# Patient Record
(demographics unavailable — no encounter records)

---

## 2025-01-29 NOTE — PHYSICAL EXAM
[Right] : right knee [de-identified] : Constitutional: The patient appears well developed, well nourished. Examination of patients ability to communicate functionally was normal.       Neurologic: Coordination is normal. Alert and oriented to time, place and person. No evidence of mood disorder, calm affect.         RIGHT    KNEE  : Inspection of the knee is as follows: moderate effusion. no ecchymosis, no streaking, no erythema, no atrophy, no deformities of the quad tendon and no deformities of patellar tendon.      MILD VARUS ALIGNMENT Palpation of the knee is as follows: medial joint line tenderness, lateral joint line tenderness, medial facet of patella tenderness, lateral facet of patella tenderness and crepitus. no palpable masses and no increased warmth.       Knee Range of Motion is as follows in degrees:        Extension: 0    Flexion: 105        Strength examination of the knee is as follows:    Quadriceps strength is 5/5    Hamstring strength is 5/5       Ligament Stability and Special Test ligamentously stable, negative anterior draw, negative Lachman test, negative posterior draw and no varus or valgus instability.    negative McMurrays test and able to do active straight leg raise without an extensor lag.       Neurological examination of the knee is as follows: light touch is intact throughout.       Gait and function is as follows: mildly antalgic gait. CANE [FreeTextEntry9] : ap/lat/skiers show medial joint bone on bone contact KL grade 4, patellar spurring

## 2025-01-29 NOTE — HISTORY OF PRESENT ILLNESS
[de-identified] : 01/29/2025  THALIA GRIFFIN 72 year y/o F presents today for right knee pain.   She saw Dr Costa who was supposed to have Right TKA however she was scared and decided to hold off. She states since July the knee pain has become severe.  She needs a cane to ambulate due to this knee pain.   Date of Onset: Over a year Mechanism of injury: NKI   Pain:    At Rest: 6/10    With Activity: 9/10   Have you been treated for this in the past? Patient reports she saw Dr. Tse and had an ablation about 1 year ago. Patient reports she is currently prescribed oxycodone 5mg. Patient reports she saw Dr. Costa and was supposed to get a RTKA but the patient reports she was scared.  OTC/Rx Medication: Heat, Ice, Elevation: Previous Imaging/Studies:

## 2025-01-29 NOTE — DISCUSSION/SUMMARY
[de-identified] : Diagnosis Knee Osteoarthritis    THALIA GRIFFIN 72 year  F was seen and evaluated in the office today. Following evaluation, and history of the patient's condition at length, the pathology was explained in full to the patient in layman's terms. Patient has Knee Osteoarthritis. Osteoarthritis is a degenerative joint disease where the cartilage in the knee gradually wears away, leading to pain, stiffness, and reduced mobility. Initially, symptoms may be mild, however this is a progressive condition, and the pain is expected to become more severe and persistent. Advanced stages of knee OA can result in significant disability, making daily activities challenging. I discussed with the patient that since this condition is expected to continue to worsen, they will eventually need a total knee replacement in their life time.   In the interim, discussed with patient several different treatment options regarding managing the gradual loss of function associated with knee OA, along with specific risks and benefits. Nonsurgical options including but not limited to Corticosteroid Injection, Visco Supplementation, Meloxicam 15mg, Medrol Dose Pack, along with activity modification such as non-impact exercise and organized physical therapy. The risk of morbidity associated with proposed treatments were discussed.   Furthermore, discussed with THALIA that they could also delay any immediate treatment options and continue to observe and self-care for the discussed problem. Discussed Home Exercise Programs as well as Rest, Ice and elevation. Patient had ample time to ask any questions about todays visit and the diagnosis, and all questions were answered. Patient verbally expressed they understand the plan going forward.   --   At this time, indicated patient for Meloxicam 15mg due to pain and inflammation of the knee.   Patient has previously tried meloxicam without relief    --   At this time, patient is indicated for physical therapy due to their reduced ROM and weakness. Discussed with patient the benefits of physical therapy due to their current pain and limited function. Patient has tried this with no relief. --  At this time the plan is for RIGHT TOTAL KNEE ARTHROPLASTY @ [facility] Patient currently smoking 1 PPD, discussed that smoking is deleterious to patients' health and increases patients risk of infection and post operative complication. Patient advised that they must stop smoking. Discussed that the patient should consult PCP regarding smoking cessation options.

## 2025-05-19 NOTE — HISTORY OF PRESENT ILLNESS
[de-identified] : 05/19/2025  THALIA GRIFFIN 72 year y/o F presents today for follow up on right knee pain. Treatments since last visit: Patient reports taking tylenol and meloxicam PRN with no relief.  Changes Since Last Visit: Patient reports her pain has significantly increased since last visit. Patient reports difficulty with stairs and and walking. Patient is ambulating with a cane. Patient was scheduled for RTKA on 03/11/25 and surgery was canceled. Patient reports she was unaware of the surgery date.   01/29/2025  THALIA GRIFFIN 72 year y/o F presents today for right knee pain.   She saw Dr Costa who was supposed to have Right TKA however she was scared and decided to hold off. She states since July the knee pain has become severe.  She needs a cane to ambulate due to this knee pain.   Date of Onset: Over a year Mechanism of injury: NKI   Pain:    At Rest: 6/10    With Activity: 9/10   Have you been treated for this in the past? Patient reports she saw Dr. Tse and had an ablation about 1 year ago. Patient reports she is currently prescribed oxycodone 5mg. Patient reports she saw Dr. Costa and was supposed to get a RTKA but the patient reports she was scared.  OTC/Rx Medication: Heat, Ice, Elevation: Previous Imaging/Studies:

## 2025-05-19 NOTE — PHYSICAL EXAM
[de-identified] : Constitutional: The patient appears well developed, well nourished. Examination of patients ability to communicate functionally was normal.       Neurologic: Coordination is normal. Alert and oriented to time, place and person. No evidence of mood disorder, calm affect.         RIGHT    KNEE  : Inspection of the knee is as follows: moderate effusion. no ecchymosis, no streaking, no erythema, no atrophy, no deformities of the quad tendon and no deformities of patellar tendon.      MILD VARUS ALIGNMENT Palpation of the knee is as follows: medial joint line tenderness, lateral joint line tenderness, medial facet of patella tenderness, lateral facet of patella tenderness and crepitus. no palpable masses and no increased warmth.       Knee Range of Motion is as follows in degrees:        Extension: 0    Flexion: 105        Strength examination of the knee is as follows:    Quadriceps strength is 5/5    Hamstring strength is 5/5       Ligament Stability and Special Test ligamentously stable, negative anterior draw, negative Lachman test, negative posterior draw and no varus or valgus instability.    negative McMurrays test and able to do active straight leg raise without an extensor lag.       Neurological examination of the knee is as follows: light touch is intact throughout.       Gait and function is as follows: mildly antalgic gait. CANE

## 2025-05-19 NOTE — DISCUSSION/SUMMARY
[de-identified] : Diagnosis Knee Osteoarthritis    THALIA GRIFFIN 72 year  F was seen and evaluated in the office today. Following evaluation, and history of the patient's condition at length, the pathology was explained in full to the patient in layman's terms. Patient has Knee Osteoarthritis. Osteoarthritis is a degenerative joint disease where the cartilage in the knee gradually wears away, leading to pain, stiffness, and reduced mobility. Initially, symptoms may be mild, however this is a progressive condition, and the pain is expected to become more severe and persistent. Advanced stages of knee OA can result in significant disability, making daily activities challenging. I discussed with the patient that since this condition is expected to continue to worsen, they will eventually need a total knee replacement in their life time.   In the interim, discussed with patient several different treatment options regarding managing the gradual loss of function associated with knee OA, along with specific risks and benefits. Nonsurgical options including but not limited to Corticosteroid Injection, Visco Supplementation, Meloxicam 15mg, Medrol Dose Pack, along with activity modification such as non-impact exercise and organized physical therapy. The risk of morbidity associated with proposed treatments were discussed.   Furthermore, discussed with THALIA that they could also delay any immediate treatment options and continue to observe and self-care for the discussed problem. Discussed Home Exercise Programs as well as Rest, Ice and elevation. Patient had ample time to ask any questions about todays visit and the diagnosis, and all questions were answered. Patient verbally expressed they understand the plan going forward.    At this time, indicated patient for Meloxicam 15mg due to pain and inflammation of the knee.   Patient has previously tried meloxicam without relief  At this time the plan is for RIGHT TOTAL KNEE ARTHROPLASTY @ [place] Patient currently smoking 1 PPD, discussed that smoking is deleterious to patients' health and increases patients risk of infection and post operative complication. Patient advised that they must stop smoking. Discussed that the patient should consult PCP regarding smoking cessation options.   Assessment & Indication: The patient has progressively severe knee pain and disability secondary to degenerative joint disease (DJD) and has failed extensive nontreatments, including activity modification, analgesic medications, and therapeutic exercise. Based on persistent symptoms and lack of response to non-surgical management, I have recommended a RIGHT Total Knee Replacement (TKR).  Procedure Discussion: A detailed discussion was held regarding the nature of the total knee replacement procedure, including surgical steps, expected recovery timeline, and anticipated outcomes. A model of the implant to be used was utilized to demonstrate the various bearing surfaces and functionality.  Expected Recovery & Rehabilitation: The patient was informed of the expected time course for return of function and pain relief. We discussed the importance of compliance with postoperative instructions to facilitate optimal recovery and minimize complications. Emphasis was placed on active participation in rehabilitation and its impact on both short- and long-term outcomes. The discharge plan includes home care nursing and physical therapy, provided it is appropriate and covered by the patients insurance. The patient was encouraged to arrange for assistance at home following surgery.  Outcomes & Expectations: We reviewed the expected surgical outcomes, the likelihood of satisfaction after full recovery, and potential causes of dissatisfaction, including the possibility of recurrent pain, lack of improvement, or worsening pain.  Risks & Complications: The patient was informed in detail about the risks associated with total knee replacement, including but not limited to:  Surgical risks: Infection, wound complications, bone fracture, tendon or ligament injury, nerve injury, vascular injury, hemorrhage, stiffness, instability, persistent pain, fixation failure, need for reoperation or manipulation under anesthesia.  Perioperative medical risks: Deep vein thrombosis (DVT), pulmonary embolism, heart attack, stroke, cardiopulmonary complications, and other risks related to medical comorbidities, including elevated body mass index (BMI).  Anesthesia risks: Potential complications related to anesthesia, including death, were also discussed. Though I defer to the anesthesia team to discuss complications of anesthesia procedures.  To mitigate these risks, we will use sterile technique, perioperative antibiotics, and DVT prophylaxis when appropriate. The patient will be monitored postoperatively in the office setting to track recovery progress.  Implant Selection & Durability: We discussed the durability of prosthetic knees and potential long-term concerns, including wear, osteolysis, and loosening. I plan to use a [employer], possibly uncemented to be determined based on bone quality implant, which I feel most comfortable utilizing for primary TKR. If the patient prefers a different implant brand/type, they may request it, and I will consider the request or suggest an additional surgical opinion from a surgeon using that brand.  Preoperative Medical Clearance: The patient was advised of the need for a preoperative medical risk evaluation by their primary care physician (PCP). Additional subspecialty clearances, such as cardiac evaluation, may be required based on the PCPs assessment.  Informed Consent & Next Steps: The risks, benefits, and alternatives to surgery were discussed at length. The patient actively participated in the discussion, had their questions answered, and agreed to proceed with elective TKR. They were instructed to coordinate with my surgical team for scheduling, preoperative testing, and medical optimization.  Follow-up will be arranged for surgery, or sooner if needed.